# Patient Record
Sex: FEMALE | Employment: FULL TIME | URBAN - METROPOLITAN AREA
[De-identification: names, ages, dates, MRNs, and addresses within clinical notes are randomized per-mention and may not be internally consistent; named-entity substitution may affect disease eponyms.]

---

## 2024-07-06 ENCOUNTER — APPOINTMENT (OUTPATIENT)
Dept: RADIOLOGY | Facility: HOSPITAL | Age: 59
End: 2024-07-06
Payer: COMMERCIAL

## 2024-07-06 ENCOUNTER — CLINICAL SUPPORT (OUTPATIENT)
Dept: EMERGENCY MEDICINE | Facility: HOSPITAL | Age: 59
End: 2024-07-06
Payer: COMMERCIAL

## 2024-07-06 ENCOUNTER — HOSPITAL ENCOUNTER (OUTPATIENT)
Facility: HOSPITAL | Age: 59
Setting detail: OBSERVATION
Discharge: HOME | End: 2024-07-06
Attending: STUDENT IN AN ORGANIZED HEALTH CARE EDUCATION/TRAINING PROGRAM | Admitting: PSYCHIATRY & NEUROLOGY
Payer: COMMERCIAL

## 2024-07-06 VITALS
OXYGEN SATURATION: 95 % | TEMPERATURE: 97.5 F | BODY MASS INDEX: 27.49 KG/M2 | DIASTOLIC BLOOD PRESSURE: 82 MMHG | SYSTOLIC BLOOD PRESSURE: 143 MMHG | WEIGHT: 165 LBS | HEART RATE: 77 BPM | HEIGHT: 65 IN | RESPIRATION RATE: 18 BRPM

## 2024-07-06 DIAGNOSIS — R53.1 LEFT-SIDED WEAKNESS: Primary | ICD-10-CM

## 2024-07-06 DIAGNOSIS — Z86.73 STROKE, RECENT, WITHOUT LATE EFFECT: ICD-10-CM

## 2024-07-06 LAB
ALBUMIN SERPL BCP-MCNC: 4.2 G/DL (ref 3.4–5)
ALP SERPL-CCNC: 108 U/L (ref 33–136)
ALT SERPL W P-5'-P-CCNC: 18 U/L (ref 7–45)
ANION GAP BLDV CALCULATED.4IONS-SCNC: 11 MMOL/L (ref 10–25)
ANION GAP SERPL CALC-SCNC: 15 MMOL/L (ref 10–20)
APTT PPP: 32 SECONDS (ref 27–38)
AST SERPL W P-5'-P-CCNC: 20 U/L (ref 9–39)
ATRIAL RATE: 74 BPM
BASE EXCESS BLDV CALC-SCNC: 2.9 MMOL/L (ref -2–3)
BASOPHILS # BLD AUTO: 0.06 X10*3/UL (ref 0–0.1)
BASOPHILS NFR BLD AUTO: 0.9 %
BILIRUB SERPL-MCNC: 0.4 MG/DL (ref 0–1.2)
BNP SERPL-MCNC: 7 PG/ML (ref 0–99)
BODY TEMPERATURE: 37 DEGREES CELSIUS
BUN SERPL-MCNC: 16 MG/DL (ref 6–23)
CA-I BLDV-SCNC: 1.19 MMOL/L (ref 1.1–1.33)
CALCIUM SERPL-MCNC: 9.5 MG/DL (ref 8.6–10.6)
CARDIAC TROPONIN I PNL SERPL HS: 8 NG/L (ref 0–34)
CHLORIDE BLDV-SCNC: 102 MMOL/L (ref 98–107)
CHLORIDE SERPL-SCNC: 103 MMOL/L (ref 98–107)
CHOLEST SERPL-MCNC: 107 MG/DL (ref 0–199)
CHOLESTEROL/HDL RATIO: 3
CO2 SERPL-SCNC: 24 MMOL/L (ref 21–32)
CREAT SERPL-MCNC: 0.6 MG/DL (ref 0.5–1.05)
EGFRCR SERPLBLD CKD-EPI 2021: >90 ML/MIN/1.73M*2
EOSINOPHIL # BLD AUTO: 0.48 X10*3/UL (ref 0–0.7)
EOSINOPHIL NFR BLD AUTO: 7.2 %
ERYTHROCYTE [DISTWIDTH] IN BLOOD BY AUTOMATED COUNT: 14.3 % (ref 11.5–14.5)
GLUCOSE BLD MANUAL STRIP-MCNC: 174 MG/DL (ref 74–99)
GLUCOSE BLD MANUAL STRIP-MCNC: 195 MG/DL (ref 74–99)
GLUCOSE BLD MANUAL STRIP-MCNC: 196 MG/DL (ref 74–99)
GLUCOSE BLDV-MCNC: 204 MG/DL (ref 74–99)
GLUCOSE SERPL-MCNC: 189 MG/DL (ref 74–99)
HCO3 BLDV-SCNC: 27.9 MMOL/L (ref 22–26)
HCT VFR BLD AUTO: 43.4 % (ref 36–46)
HCT VFR BLD EST: 44 % (ref 36–46)
HDLC SERPL-MCNC: 35.6 MG/DL
HGB BLD-MCNC: 14.3 G/DL (ref 12–16)
HGB BLDV-MCNC: 14.6 G/DL (ref 12–16)
IMM GRANULOCYTES # BLD AUTO: 0.02 X10*3/UL (ref 0–0.7)
IMM GRANULOCYTES NFR BLD AUTO: 0.3 % (ref 0–0.9)
INR PPP: 0.9 (ref 0.9–1.1)
LACTATE BLDV-SCNC: 1.2 MMOL/L (ref 0.4–2)
LDLC SERPL CALC-MCNC: 52 MG/DL
LYMPHOCYTES # BLD AUTO: 3.43 X10*3/UL (ref 1.2–4.8)
LYMPHOCYTES NFR BLD AUTO: 51.4 %
MCH RBC QN AUTO: 25.6 PG (ref 26–34)
MCHC RBC AUTO-ENTMCNC: 32.9 G/DL (ref 32–36)
MCV RBC AUTO: 78 FL (ref 80–100)
MONOCYTES # BLD AUTO: 0.52 X10*3/UL (ref 0.1–1)
MONOCYTES NFR BLD AUTO: 7.8 %
NEUTROPHILS # BLD AUTO: 2.16 X10*3/UL (ref 1.2–7.7)
NEUTROPHILS NFR BLD AUTO: 32.4 %
NON HDL CHOLESTEROL: 71 MG/DL (ref 0–149)
NRBC BLD-RTO: 0 /100 WBCS (ref 0–0)
OXYHGB MFR BLDV: 75.7 % (ref 45–75)
P AXIS: 54 DEGREES
P OFFSET: 180 MS
P ONSET: 135 MS
PCO2 BLDV: 43 MM HG (ref 41–51)
PH BLDV: 7.42 PH (ref 7.33–7.43)
PLATELET # BLD AUTO: 245 X10*3/UL (ref 150–450)
PO2 BLDV: 47 MM HG (ref 35–45)
POTASSIUM BLDV-SCNC: 4.3 MMOL/L (ref 3.5–5.3)
POTASSIUM SERPL-SCNC: 4.1 MMOL/L (ref 3.5–5.3)
PR INTERVAL: 164 MS
PROT SERPL-MCNC: 7.3 G/DL (ref 6.4–8.2)
PROTHROMBIN TIME: 10.1 SECONDS (ref 9.8–12.8)
Q ONSET: 217 MS
QRS COUNT: 12 BEATS
QRS DURATION: 86 MS
QT INTERVAL: 424 MS
QTC CALCULATION(BAZETT): 470 MS
QTC FREDERICIA: 455 MS
R AXIS: 66 DEGREES
RBC # BLD AUTO: 5.59 X10*6/UL (ref 4–5.2)
SAO2 % BLDV: 77 % (ref 45–75)
SODIUM BLDV-SCNC: 137 MMOL/L (ref 136–145)
SODIUM SERPL-SCNC: 138 MMOL/L (ref 136–145)
T AXIS: 70 DEGREES
T OFFSET: 429 MS
TRIGL SERPL-MCNC: 96 MG/DL (ref 0–149)
TSH SERPL-ACNC: 1.73 MIU/L (ref 0.44–3.98)
VENTRICULAR RATE: 74 BPM
VLDL: 19 MG/DL (ref 0–40)
WBC # BLD AUTO: 6.7 X10*3/UL (ref 4.4–11.3)

## 2024-07-06 PROCEDURE — 2500000001 HC RX 250 WO HCPCS SELF ADMINISTERED DRUGS (ALT 637 FOR MEDICARE OP)

## 2024-07-06 PROCEDURE — 2500000004 HC RX 250 GENERAL PHARMACY W/ HCPCS (ALT 636 FOR OP/ED)

## 2024-07-06 PROCEDURE — 70544 MR ANGIOGRAPHY HEAD W/O DYE: CPT | Mod: 59

## 2024-07-06 PROCEDURE — 84132 ASSAY OF SERUM POTASSIUM: CPT | Mod: 59

## 2024-07-06 PROCEDURE — 99291 CRITICAL CARE FIRST HOUR: CPT | Performed by: STUDENT IN AN ORGANIZED HEALTH CARE EDUCATION/TRAINING PROGRAM

## 2024-07-06 PROCEDURE — 82947 ASSAY GLUCOSE BLOOD QUANT: CPT

## 2024-07-06 PROCEDURE — 70547 MR ANGIOGRAPHY NECK W/O DYE: CPT | Performed by: RADIOLOGY

## 2024-07-06 PROCEDURE — 85730 THROMBOPLASTIN TIME PARTIAL: CPT | Performed by: STUDENT IN AN ORGANIZED HEALTH CARE EDUCATION/TRAINING PROGRAM

## 2024-07-06 PROCEDURE — 96361 HYDRATE IV INFUSION ADD-ON: CPT

## 2024-07-06 PROCEDURE — 36415 COLL VENOUS BLD VENIPUNCTURE: CPT | Performed by: STUDENT IN AN ORGANIZED HEALTH CARE EDUCATION/TRAINING PROGRAM

## 2024-07-06 PROCEDURE — G0378 HOSPITAL OBSERVATION PER HR: HCPCS

## 2024-07-06 PROCEDURE — 85025 COMPLETE CBC W/AUTO DIFF WBC: CPT | Performed by: STUDENT IN AN ORGANIZED HEALTH CARE EDUCATION/TRAINING PROGRAM

## 2024-07-06 PROCEDURE — 70450 CT HEAD/BRAIN W/O DYE: CPT | Performed by: RADIOLOGY

## 2024-07-06 PROCEDURE — 70547 MR ANGIOGRAPHY NECK W/O DYE: CPT

## 2024-07-06 PROCEDURE — 2500000002 HC RX 250 W HCPCS SELF ADMINISTERED DRUGS (ALT 637 FOR MEDICARE OP, ALT 636 FOR OP/ED)

## 2024-07-06 PROCEDURE — 83036 HEMOGLOBIN GLYCOSYLATED A1C: CPT

## 2024-07-06 PROCEDURE — 70450 CT HEAD/BRAIN W/O DYE: CPT

## 2024-07-06 PROCEDURE — 70551 MRI BRAIN STEM W/O DYE: CPT

## 2024-07-06 PROCEDURE — 85610 PROTHROMBIN TIME: CPT | Performed by: STUDENT IN AN ORGANIZED HEALTH CARE EDUCATION/TRAINING PROGRAM

## 2024-07-06 PROCEDURE — 70551 MRI BRAIN STEM W/O DYE: CPT | Performed by: RADIOLOGY

## 2024-07-06 PROCEDURE — 93005 ELECTROCARDIOGRAM TRACING: CPT

## 2024-07-06 PROCEDURE — 84443 ASSAY THYROID STIM HORMONE: CPT

## 2024-07-06 PROCEDURE — 96360 HYDRATION IV INFUSION INIT: CPT

## 2024-07-06 PROCEDURE — 80053 COMPREHEN METABOLIC PANEL: CPT | Performed by: STUDENT IN AN ORGANIZED HEALTH CARE EDUCATION/TRAINING PROGRAM

## 2024-07-06 PROCEDURE — 83880 ASSAY OF NATRIURETIC PEPTIDE: CPT

## 2024-07-06 PROCEDURE — 80061 LIPID PANEL: CPT

## 2024-07-06 PROCEDURE — 84484 ASSAY OF TROPONIN QUANT: CPT | Performed by: STUDENT IN AN ORGANIZED HEALTH CARE EDUCATION/TRAINING PROGRAM

## 2024-07-06 RX ORDER — HYDRALAZINE HYDROCHLORIDE 20 MG/ML
10 INJECTION INTRAMUSCULAR; INTRAVENOUS
Status: DISCONTINUED | OUTPATIENT
Start: 2024-07-06 | End: 2024-07-06 | Stop reason: HOSPADM

## 2024-07-06 RX ORDER — CLOPIDOGREL BISULFATE 75 MG/1
75 TABLET ORAL DAILY
Qty: 20 TABLET | Refills: 0 | Status: SHIPPED | OUTPATIENT
Start: 2024-07-07 | End: 2024-07-27

## 2024-07-06 RX ORDER — LABETALOL HYDROCHLORIDE 5 MG/ML
10 INJECTION, SOLUTION INTRAVENOUS EVERY 10 MIN PRN
Status: DISCONTINUED | OUTPATIENT
Start: 2024-07-06 | End: 2024-07-06 | Stop reason: HOSPADM

## 2024-07-06 RX ORDER — HYDRALAZINE HYDROCHLORIDE 25 MG/1
25 TABLET, FILM COATED ORAL EVERY 6 HOURS PRN
Status: DISCONTINUED | OUTPATIENT
Start: 2024-07-08 | End: 2024-07-06 | Stop reason: HOSPADM

## 2024-07-06 RX ORDER — CLOPIDOGREL BISULFATE 75 MG/1
75 TABLET ORAL DAILY
Status: DISCONTINUED | OUTPATIENT
Start: 2024-07-07 | End: 2024-07-06 | Stop reason: HOSPADM

## 2024-07-06 RX ORDER — ASPIRIN 81 MG/1
81 TABLET ORAL DAILY
Status: DISCONTINUED | OUTPATIENT
Start: 2024-07-07 | End: 2024-07-06 | Stop reason: HOSPADM

## 2024-07-06 RX ORDER — DEXTROSE 50 % IN WATER (D50W) INTRAVENOUS SYRINGE
25
Status: DISCONTINUED | OUTPATIENT
Start: 2024-07-06 | End: 2024-07-06 | Stop reason: HOSPADM

## 2024-07-06 RX ORDER — POLYETHYLENE GLYCOL 3350 17 G/17G
17 POWDER, FOR SOLUTION ORAL DAILY
Status: DISCONTINUED | OUTPATIENT
Start: 2024-07-06 | End: 2024-07-06 | Stop reason: HOSPADM

## 2024-07-06 RX ORDER — ATORVASTATIN CALCIUM 20 MG/1
40 TABLET, FILM COATED ORAL NIGHTLY
Status: DISCONTINUED | OUTPATIENT
Start: 2024-07-06 | End: 2024-07-06 | Stop reason: HOSPADM

## 2024-07-06 RX ORDER — INSULIN LISPRO 100 [IU]/ML
0-5 INJECTION, SOLUTION INTRAVENOUS; SUBCUTANEOUS
Status: DISCONTINUED | OUTPATIENT
Start: 2024-07-06 | End: 2024-07-06 | Stop reason: HOSPADM

## 2024-07-06 RX ORDER — CLOPIDOGREL BISULFATE 300 MG/1
300 TABLET, FILM COATED ORAL ONCE
Status: COMPLETED | OUTPATIENT
Start: 2024-07-06 | End: 2024-07-06

## 2024-07-06 RX ORDER — SODIUM CHLORIDE, SODIUM LACTATE, POTASSIUM CHLORIDE, CALCIUM CHLORIDE 600; 310; 30; 20 MG/100ML; MG/100ML; MG/100ML; MG/100ML
100 INJECTION, SOLUTION INTRAVENOUS CONTINUOUS
Status: DISCONTINUED | OUTPATIENT
Start: 2024-07-06 | End: 2024-07-06 | Stop reason: HOSPADM

## 2024-07-06 RX ORDER — DEXTROSE 50 % IN WATER (D50W) INTRAVENOUS SYRINGE
12.5
Status: DISCONTINUED | OUTPATIENT
Start: 2024-07-06 | End: 2024-07-06 | Stop reason: HOSPADM

## 2024-07-06 RX ORDER — ENOXAPARIN SODIUM 100 MG/ML
40 INJECTION SUBCUTANEOUS EVERY 24 HOURS
Status: DISCONTINUED | OUTPATIENT
Start: 2024-07-06 | End: 2024-07-06 | Stop reason: HOSPADM

## 2024-07-06 RX ORDER — DOCUSATE SODIUM 50 MG/5ML
100 LIQUID ORAL 2 TIMES DAILY
Status: DISCONTINUED | OUTPATIENT
Start: 2024-07-06 | End: 2024-07-06

## 2024-07-06 RX ORDER — ASPIRIN 81 MG/1
81 TABLET ORAL DAILY
Qty: 360 TABLET | Refills: 0 | Status: SHIPPED | OUTPATIENT
Start: 2024-07-07 | End: 2025-07-07

## 2024-07-06 RX ORDER — NAPROXEN SODIUM 220 MG/1
81 TABLET, FILM COATED ORAL ONCE
Status: COMPLETED | OUTPATIENT
Start: 2024-07-06 | End: 2024-07-06

## 2024-07-06 RX ADMIN — ASPIRIN 81 MG 81 MG: 81 TABLET ORAL at 09:29

## 2024-07-06 RX ADMIN — INSULIN LISPRO 1 UNITS: 100 INJECTION, SOLUTION INTRAVENOUS; SUBCUTANEOUS at 12:48

## 2024-07-06 RX ADMIN — CLOPIDOGREL BISULFATE 300 MG: 300 TABLET, FILM COATED ORAL at 09:29

## 2024-07-06 RX ADMIN — SODIUM CHLORIDE, POTASSIUM CHLORIDE, SODIUM LACTATE AND CALCIUM CHLORIDE 100 ML/HR: 600; 310; 30; 20 INJECTION, SOLUTION INTRAVENOUS at 09:03

## 2024-07-06 ASSESSMENT — PAIN - FUNCTIONAL ASSESSMENT: PAIN_FUNCTIONAL_ASSESSMENT: 0-10

## 2024-07-06 ASSESSMENT — COLUMBIA-SUICIDE SEVERITY RATING SCALE - C-SSRS
6. HAVE YOU EVER DONE ANYTHING, STARTED TO DO ANYTHING, OR PREPARED TO DO ANYTHING TO END YOUR LIFE?: NO
1. IN THE PAST MONTH, HAVE YOU WISHED YOU WERE DEAD OR WISHED YOU COULD GO TO SLEEP AND NOT WAKE UP?: NO
2. HAVE YOU ACTUALLY HAD ANY THOUGHTS OF KILLING YOURSELF?: NO

## 2024-07-06 ASSESSMENT — PAIN SCALES - GENERAL: PAINLEVEL_OUTOF10: 0 - NO PAIN

## 2024-07-06 NOTE — DISCHARGE INSTRUCTIONS
Dear MsMaxine Farzad,    It was a pleasure treating you at [facility]. You came in left sided numbness which resolved on its own. We think you had a small stroke - which is when a clot form in one of your small vessels supplying blood to the brain.      We have made the following medication changes:   Aspirin 81mg to prevent future strokes  - indefinitely  Plavix 75mg daily for 20 days starting on 7/7  Please continue taking your home atorvastatin 10mg  We have an heart ultrasound ordered for you (complete TTE with bubble study) - please   Call scheduling line at [phone] ([phone]) to schedule your TTE.     Please schedule an appointment with stroke Neurology in your hometown in 6-12 weeks.   If you would like to schedule an appointment with us.  please call the scheduling line at [phone] ([phone])     Stroke prevention:  Eat a healthy diet with plenty of fresh fruits and vegetables.  Avoid salt and fried foods.  Lose weight and exercise on a regular basis.  Do not smoke or use drugs.  Be sure to take all medications.  Call 911 for signs of stroke (numbness or weakness on one side, trouble seeing on one side, trouble speaking (either because your speech is slurred or you can't find the words), sudden double vision, spinning sensation or loss of coordination).    To find a stroke support group: [email or web address]://[email or web address].org/en/stroke-support-group-finder  To learn about Virtual Stroke Educations sessions: contact Kathryn Morejon at: [email or web address]    wishing you a speedy recovery,    Neurology

## 2024-07-06 NOTE — ED TRIAGE NOTES
Lkw 2200 last night, had some left side numbness to arm and leg that subsided, woke up this am for left side numbness to face along with arms and legs, dr puente in triage to assess pt, BAT called

## 2024-07-06 NOTE — HOSPITAL COURSE
Nat Panda is a 59 y.o. female with PMH of hypothyroidism, DM-2 and HLD presenting with left sided numbness. Per patient and son at bedside patient has onset of intermittent left lower extremity numbness last night which resolved and this AM numbness progressed to involve left side of the face and arm. BAT was called on arrival to [facility].   NIHSS: 1 (sensory loss)     MRS: 0  Last known well: 10PM on 7/6  Had stroke symptoms resolved at time of presentation: No     As patient was OOW from LKN>4.5hr, they werent a candidate for TNK  Low NIHSS, and low concern for LVO - not a candidate for MT.      MRI brain was done and showed evidence of small vessel disease but no acute stroke. MRA H&N was negative for intracranial atherosclerosis. Patient was kept under permissive HTN. Patient was loaded with plavix 300mg and aspirin 81mg with plan for 21 days of DAPT per [facility] protocol. Symptoms were attributed to a stuttering lacune causing fluctuating left sided numbness.     HbA1c: 7  LDL: 52  Patient and family opted to continue workup outpatient. Patient was discharged to home and instructed to follow up with stroke in 6-12 weeks.

## 2024-07-06 NOTE — ED PROCEDURE NOTE
Procedure  Critical Care    Performed by: River Guevara MD  Authorized by: River Guevara MD    Critical care provider statement:     Critical care time (minutes):  32  Comments:      Critical Care Time  Authorized and Performed by: River Guevara MD  Total critical care time: [32] minutes  Due to a high probability of clinically significant, life threatening deterioration, the patient required my highest level of preparedness to intervene emergently and I personally spent this critical care time directly and personally managing the patient. This critical care time included obtaining a history; examining the patient; pulse oximetry; ordering and review of studies; arranging urgent treatment with development of a management plan; evaluation of patient's response to treatment; frequent reassessment; and, discussions with other providers and patient/family.  This critical care time was performed to assess and manage the high probability of imminent, life-threatening deterioration that could result in multi-organ failure. It was exclusive of separately billable procedures and treating other patients and teaching time.  Please see MDM section and the rest of the note for further information on patient assessment and treatment.             River Guevaar MD  07/06/24 0825

## 2024-07-06 NOTE — H&P
History Of Present Illness  Nat aPnda is a 59 y.o. female with PMH of hypothyroidism, DM-2 and HLD presenting with left sided numbness. Per patient and son at bedside patient has onset of intermittent left lower extremity numbness last night which resolved and this AM numbness progressed to involve left side of the face and arm. BAT was called on arrival to [facility].    NIHSS: 1 (sensory loss)    MRS: 0  Last known well: 10PM on 7/6  Had stroke symptoms resolved at time of presentation: No    As patient was OOW from LKN>4.5hr, they werent a candidate for TNK  Low NIHSS, and low concern for LVO - not a candidate for MT.     Past Medical History  Patient Active Problem List   Diagnosis Code    Fibromyalgia M79.7    DDD (degenerative disc disease), cervical M50.30    Anxiety F41.9    Dense breasts R92.30    Type 2 diabetes mellitus without complication (HCC) E11.9    Chronic neutropenia (HCC) D70.9    Hallux valgus M20.10    Hallux limitus M20.5X9    Otalgia of left ear H92.02    Hypothyroidism due to acquired atrophy of thyroid E03.4    Overweight (BMI 25.0-29.9) E66.3    Fatty infiltration of liver K76.0     Surgical History  No past surgical history on file.    Social History   Non smoker, no alcohol use, no illicit drug use  Lives at home with Son and     Allergies  Penicillins  Home Medications  Current Outpatient Medications on File Prior to Visit   Medication Sig Dispense Refill    atorvaSTATin (LIPITOR) 10 MG tablet TAKE 1 TABLET BY MOUTH DAILY FOR HIGH AMOUNTS OF FAT IN THE BLOOD 90 tablet 1    cetirizine (ZYRTEC) 10 MG tablet Take 1 tablet (10 mg) by mouth daily        empagliflozin (JARDIANCE) 10 MG tablet Take 1 tablet (10 mg) by mouth daily for 7 days Indications: Type 2 Diabetes 7 tablet 0    Fluocin-Hydroquinone-Tretinoin (TRI-GEN) 0.01-4-0.05 % CREA APPLY TOPICALLY DAILY A THIN FILM TO AFFECTED AREA FOR 3 MONTH        levothyroxine (SYNTHROID) 50 MCG tablet 1 tablet (50 mcg) every 24 hours      "   Multiple Vitamin (MULTI-VITAMIN DAILY PO) Take        VITAMIN D PO Take           Review of Systems  Neurological Exam  Physical Exam  Last Recorded Vitals  Blood pressure 160/80, pulse 73, temperature 36.4 °C (97.5 °F), temperature source Tympanic, resp. rate 16, height 1.651 m (5' 5\"), weight 74.8 kg (165 lb), SpO2 97%.    Neurological Exam:  MENTAL STATUS:  General appearance: No distress, alert, interactive and cooperative.    Orientation:x3  Language: Expression, repetition, naming, comprehension intact  Follows complex commands across midline  Thought processes: Logical, organized  Concentration: Intact  Fund of knowledge: Appropriate  Judgment: Intact  Insight: Intact    CRANIAL NERVES:  - II:  Visual fields intact to confrontation bilaterally tested individually and together  - III, IV, VI: PERRL, EOMI to pursuit without nystagmus  - V: V1-V3 sensation intact bilaterally  - VII: Face muscles symmetric with smile and eye closure  - VIII: Intact to finger rub bilaterally  - IX, X: Palate elevated symmetrically bilaterally, no hoarseness  - XI: 5/5 strength on shoulder shrugging bilaterally  - XII: Tongue midline without atrophy or fasciculation    MOTOR: Tone and bulk normal in all extremities  No pronator drift bilaterally.   No fasciculations, tremor or other abnormal movements were present.     STRENGTH: R L  Deltoid  5 5-  Biceps  5 5-  Triceps  5 5-    5 5-    Hip flexion 5 5  Quadriceps 5 5  Hamstrings 5 5  DorsiFlex 5          5  PlantarFlex 5 5    REFLEXES:  R  L  Biceps   2  2  Triceps   2  2  Brachioradialis  2  2  Patellar   2  2  Achilles 2  2  Plantar  Down Down    No Chong, crossed adductor, Babinski, or clonus    COORDINATION: Intact on finger to nose bl, intact on heel to shin bl, LUCY intact bl    SENSORY: Reduced pinprick in LLE    PROPRIOCEPTION:  Intact in fingers and toes bilaterally      GAIT: Not tested    Relevant Results      NIH Stroke Scale  1A. Level of Consciousness: Alert, " Keenly Responsive  1B. Ask Month and Age: Both Questions Right  1C. Blink Eyes & Squeeze Hands: Performs Both Tasks  2. Best Gaze: Normal  3. Visual: No Visual Loss  4. Facial Palsy: Normal Symmetrical Movements  5A. Motor - Left Arm: No Drift  5B. Motor - Right Arm: No Drift  6A. Motor - Left Leg: No Drift  6B. Motor - Right Leg: No Drift  7. Limb Ataxia: Absent  8. Sensory Loss: Mild-to-Moderate Sensory Loss  9. Best Language: No Aphasia  10. Dysarthria: Normal  11. Extinction and Inattention: No Abnormality  NIH Stroke Scale: 1           [place] Coma Scale  Best Eye Response: Spontaneous  Best Verbal Response: Oriented  Best Motor Response: Follows commands  Radha Coma Scale Score: 15                   CT brain attack head wo IV contrast  Result Date: 7/6/2024    No acute intracranial abnormality.   Signed by: Brenda Torres 7/6/2024 8:53 AM Dictation workstation:   [id number]  .     Stroke Alert CT/MRI review: Was interpreted as it was being performed     IV Thrombolysis IV Thrombolysis Checklist      IV Thrombolysis Given: No; Thrombolysis contraindication reason: Time from Last Known Well (or stroke onset) is >4.5 hours        Assessment/Plan   Principal Problem:    Stroke, recent, without late effect  Nat Panda is a 59 y.o. female with PMH of hypothyroidism, DM-2 and HLD presenting with fluctuating left sided numbness. Per patient and son at bedside patient has onset of intermittent left lower extremity numbness last night which resolved and this AM numbness progressed to involve left side of the face and arm. No TNK or MT. Loaded with plavix and started on aspirin. Patients presentation c/f a stuttering lacune. Will admit to stroke team for expedited stroke workup.     Type: Ischemic stroke  Subtype/etiology: Stuttering lacune - likely small vessel disease  Vessels involved: possibly R. Anterior choridal vs thalamic vasculature  Neurological manifestations: fluctuating left sided numbness, LUE mild  weakness  NIHSS (worst at presentation): 1   Diagnostic evaluation: CT head, MRI Brain, MRA H&N  Antiplatelet/antithrombotic plan for stroke prevention: Plavix, Aspirin  VTE prophylaxis: Lovenox  Vascular Risk Factor modification goals:  Blood pressure goals: avoid hypotension SBP <100 that could worsen cerebral perfusion, Ischemic stroke- early permissive hypertension SBP < 220 mmHg with cautious inpatient lowering  Lipid Goals: education on healthy diet and statin therapy to maintain or achieve goal LDL-cholesterol < 70mg  Glucose Goals: early treatment of hyperglycemia to goal glucose 140-180 mg/dl with long-term goal A1c < 7%   Smoking Cessation and Education  Assessment for Rehabilitation needs   Patient and family education on signs and symptoms of stroke, calling 911, healthy strategies for stroke prevention.       Plan:   #Ischemic stroke  #R. Lacunar stroke  NIHSS: 1   Neurologically - left hemisensory loss (fluctuating) and LUE mild weakness  ::Stroke risk factors: DM2 and HLD  - CT head neg  -Pending MRI Brain wo contrast and MRA h&N  -Pending TTE with bubble study  -Pending HbA1c and LDL  -DAPT per [facility] protocol  -Started aspirin 81mg  -s/p 300mg plavix - will start plavix 75mg tomorrow  - started atorvastatin 40mg  -On Tele  -SBP< 220 for permissive HTN  - q4hr Neuro checks  -NPO pending nursing bedside assessment    #HLD  -On home atorvastatin 10mg  -stopped  -Started on atorvastatin 40mg  -LDL pending    #Hypothyroidism  -c/w home 50mcg synthroid    #DM2  -Hold home anti-glycemic agents  -Mild SSI +accu checks  -Hypoglycemia protocol    F: PRN  E: PRN  N: Regular diet pending nursing assessment  DVT ppx: SCDs, Lovenox  PPI: None indicated    Code Status: Full code  NOK: : will input info in EMR (Son is pulm fellow at )  I spent 60 minutes in the professional and overall care of this patient.    Sharon Bermudez MD  Neurology PGY-3

## 2024-07-06 NOTE — ED PROVIDER NOTES
CC: Stroke     History provided by: Patient and Family Member  Limitations to History: None    HPI:  Patient is a 59-year-old female with history of diabetes, hyperlipidemia on statin who presents  as a brain attack.  Patient presents with family at bedside.  Last known well 10 PM yesterday.  Patient states she went to bed with left thigh sensory deficits.  When she woke up this morning the sensory loss and numbness increased and present in her left upper extremity and face.  She denies any history of strokes, heart attacks.  Blood sugar within normal limits.    External Records Reviewed:  I reviewed prior ED visits, Care Everywhere, discharge summaries and outpatient records as appropriate.   ???????????????????????????????????????????????????????????????  Triage Vitals:  T 36.4 °C (97.5 °F)  HR 73  /79  RR 16  O2 98 % None (Room air)    Physical Exam  Vitals and nursing note reviewed.   Constitutional:       General: She is not in acute distress.     Appearance: Normal appearance.   HENT:      Head: Normocephalic and atraumatic.   Eyes:      Conjunctiva/sclera: Conjunctivae normal.   Cardiovascular:      Rate and Rhythm: Normal rate and regular rhythm.   Pulmonary:      Effort: Pulmonary effort is normal. No respiratory distress.   Abdominal:      General: Abdomen is flat.      Palpations: Abdomen is soft.   Musculoskeletal:         General: Normal range of motion.      Cervical back: Normal range of motion and neck supple.   Skin:     General: Skin is warm and dry.   Neurological:      Mental Status: She is alert and oriented to person, place, and time. Mental status is at baseline.      Comments: - LKW: 2200 7/5/2024      - 1a. Level of Consciousness: 0/3      - 1b. LOC Questions: 0/2      - 1c. LOC Commands: 0/2      - 2. Best Gaze: 0/2      - 3. Visual Fields: 0/3      - 4. Facial Palsy: 0/3      - 5a. Motor Arm (Left): 0/4      - 5b. Motor Arm (Right): 0/4      - 6a. Motor Leg (Left): 0/4      - 6b.  Motor Leg (Right): 0/4      - 7. Limb Ataxia: 0/2      - 8. Sensory: 1/2      - 9. Best Language: 0/3      - 10. Dysarthria: 0/2      - 11. Extinction/Neglect: 0/2      - TOTAL: 1/42      - VAN: Negative      - TIME: 0848    Left facial, upper extremity and left lower extremity sensory deficit compared to right     Psychiatric:         Mood and Affect: Mood normal.         Behavior: Behavior normal.        ???????????????????????????????????????????????????????????????  ED Course/Treatment/Medical Decision Making  MDM:  Patient is a 59-year-old female who presents as a brain attack.  Vital signs are stable, she is normotensive.  Van negative, initial NIH of 1 for sensory deficits to left face, upper extremity and lower extremity.  Stroke team at bedside.  Initial CT scan of head reviewed without obvious infarct or hemorrhage.  Patient is not a TNK candidate at this time due to last known well being 10 PM last night, not on any blood thinners, no recent surgery noted.      ED Course:  ED Course as of 07/06/24 0906   Sat Jul 06, 2024   0903 Neuro stroke at bedside, patient admitted to their service with workup pending, will follow while patient is boarding in ED [SA]   0904 Patient loaded with 300mg Plavix and aspirin 81mg [SA]      ED Course User Index  [SA] Yany Owens, DO         Diagnoses as of 07/06/24 0906   Left-sided weakness       Scoring Tools Utilized:       EKG Interpretation:  See ED Course/Below:    Independent Interpretation of Studies:  I independently interpreted labs/imaging as stated in ED Course or below.    Differential diagnoses considered include but are not limited to: See MDM/Below:    Social Determinants Limiting Care:  None identified The following actions were taken to address these social determinants: None    Discussion of Management with Other Providers: See MDM/Below:    Disposition:  Admitted    Yany Owens, SUNSHINE, PGY-3    I reviewed the case with the attending ED physician.  The attending ED physician agrees with the plan. Patient and/or patient´s representative was counseled regarding labs, imaging, likely diagnosis, and plan. All questions were answered.    Disclaimer: This note was dictated by speech recognition.  Attempt at proofreading was made to minimize errors.  Errors in transcription may be present.  Please call if questions.    Procedures ? BemDireto last updated 7/6/2024 9:06 AM     ATTENDING NOTE for River Guevara MD:    ATTENDING ATTESTATION:  The patient was seen by the resident/fellow.  I have personally performed a substantive portion of the encounter.  I have seen and examined the patient; agree with the workup, evaluation, MDM, management and diagnosis.  The care plan has been discussed with the resident/fellow; I have reviewed the resident/fellow´s note and agree with the documented findings with the exception/addition of the following:    Patient is a 59-year-old woman with history of diabetes hyperlipidemia and hypothyroidism who presents as a brain attack due to decreased sensation on the left side of her body.  Patient reports that she developed some decree sensation in her left leg the previous night and thinks it may have resolved before she went to bed at 10 PM which was the time that she was last known well.  When she woke this morning she had decreased sensation on the entire left side of her body including her face upper extremity and lower extremity again.  Patient's son is a critical care fellow and assessed her and said that she had no other neurologic deficits the previous evening.  Patient has had no falls or trauma and denies any pain.  Patient is Van negative but due to concern that this might be a wake-up stroke, we did make her a brain attack to involve neurology.  We did not do vessel imaging initially given low suspicion for dissection or large vessel occlusion.    ---------------------------------------------------------            Yany  DO Roman  Resident  07/06/24 0906

## 2024-07-06 NOTE — DISCHARGE SUMMARY
Discharge Diagnosis  Stroke, recent, without late effect    Problem List  Principal Problem:    Stroke, recent, without late effect      Nat Panda is a 59 y.o. female who presented to the hospital with L. Sided numbness. They were diagnosed with a stroke.  Etiology: Ischemic Stroke: Small-vessel disease    Relevant hospital complications: None  Discharge antithrombotics: aspirin and plavix  Pending evaluation:  TTE with bubble study   Issues Requiring Follow-Up  Stroke follow up in 6-12 weeks    MR brain wo IV contrast    Result Date: 7/6/2024  Interpreted By:  Brenda Torres, STUDY: MR BRAIN WO IV CONTRAST; MR ANGIO NECK WO IV CONTRAST; MR ANGIO HEAD WO IV CONTRAST   INDICATION: Signs/Symptoms:Left sided numbness; Signs/Symptoms:stroke workup; Signs/Symptoms:vessel imaging iso left sided numbness   COMPARISON: Head CT performed earlier today.   ACCESSION NUMBER(S): [id number]; [id number]; [id number]   ORDERING CLINICIAN: KALIA MESA   TECHNIQUE: Multi-planar multi-sequential MR imaging of the brain was performed without intravenous contrast. MRA of the head using time-of-flight technique was performed without intravenous contrast. MRA of the neck using time-of-flight technique was performed without intravenous contrast.   FINDINGS: MRI BRAIN:   No acute infarction, intracranial hemorrhage or mass lesion.   No hydrocephalus.  No extra-axial fluid collections. The skull base flow voids are present.   The visualized intraorbital contents are normal. The imaged portions of the paranasal sinuses are clear. The mastoid air cells are clear. The visualized osseous structures, soft tissues and partially visualized parotid glands appear normal.   MRA NECK:   Standard configuration of the aortic arch with no gross ostial stenosis of the great vessels.   The common carotid arteries are patent bilaterally without evidence of stenosis. There is no narrowing of the cervical internal carotid arteries bilaterally.   The  vertebral arteries are patent bilaterally throughout their course.   MRA HEAD:   Normal distal internal carotid arteries.   The proximal anterior, middle and posterior cerebral arteries are patent bilaterally.   Normal vertebrobasilar system.   No evidence of vascular stenosis, occlusion, aneurysm or vascular malformation.       No acute intracranial abnormality.   Cervical and intracranial vasculature are within normal limits.   _____________ NASCET criteria for internal carotid artery stenosis: Mild: 0% to 49% Moderate: 50% to 69% Severe: 70% to 99% Complete Occlusion   Signed by: Brenda Torres 7/6/2024 12:03 PM Dictation workstation:   [id number]   No CT head results found for the past 14 days  No echocardiogram results found for the past 14 days        BNP   Date/Time Value Ref Range Status   07/06/2024 08:58 AM 7 0 - 99 pg/mL Final       Test Results Pending At Discharge  Pending Labs       Order Current Status    Hemoglobin A1C In process            Hospital Course  Nat Panda is a 59 y.o. female with PMH of hypothyroidism, DM-2 and HLD presenting with left sided numbness. Per patient and son at bedside patient has onset of intermittent left lower extremity numbness last night which resolved and this AM numbness progressed to involve left side of the face and arm. BAT was called on arrival to [facility].   NIHSS: 1 (sensory loss)     MRS: 0  Last known well: 10PM on 7/6  Had stroke symptoms resolved at time of presentation: No     As patient was OOW from LKN>4.5hr, they werent a candidate for TNK  Low NIHSS, and low concern for LVO - not a candidate for MT.      MRI brain was done and showed evidence of small vessel disease but no acute stroke. MRA H&N was negative for intracranial atherosclerosis. Patient was kept under permissive HTN. Patient was loaded with plavix 300mg and aspirin 81mg with plan for 21 days of DAPT per [facility] protocol. Symptoms were attributed to a stuttering lacune causing fluctuating left  sided numbness.     HbA1c: 7  LDL: 52  Patient and family opted to continue workup outpatient. Patient was discharged to home and instructed to follow up with stroke in 6-12 weeks.    Pertinent Physical Exam At Time of Discharge  Physical Exam  Neurological Exam  Neurological Exam:  MENTAL STATUS:  General appearance: No distress, alert, interactive and cooperative.    Orientation:x3  Language: Expression, naming, comprehension intact  Follows complex commands across midline  Concentration: Intact  Fund of knowledge: Appropriate  Judgment: Intact  Insight: Intact    CRANIAL NERVES:  - II:  Visual fields intact to confrontation bilaterally tested individually and together  - III, IV, VI: PERRL, EOMI to pursuit without nystagmus  - V: V1-V3 sensation intact bilaterally  - VII: Face muscles symmetric with smile and eye closure  - VIII: Intact to finger rub bilaterally  - IX, X: Palate elevated symmetrically bilaterally, no hoarseness  - XI: 5/5 strength on shoulder shrugging bilaterally  - XII: Tongue midline without atrophy or fasciculation    MOTOR: Tone and bulk normal in all extremities  No pronator drift bilaterally.   No fasciculations, tremor or other abnormal movements were present.     STRENGTH: R L  Deltoid  5 5  Biceps  5 5  Triceps  5 5    5 5    Hip flexion 5 5  Quadriceps 5 5  Hamstrings 5 5  DorsiFlex 5          5  PlantarFlex 5 5    REFLEXES:  R  L  Biceps   2  2  Triceps   2  2  Brachioradialis  2  2  Patellar   2  2  Achilles 2  2  Plantar  Down Down    No Chong, crossed adductor, Babinski, or clonus    COORDINATION: Intact on finger to nose bl, intact on heel to shin bl, LUCY intact bl    SENSORY: Intact to light touch, pin prick, vibration in bl UE and LE    PROPRIOCEPTION:  Intact in fingers and toes bilaterally    ROMBERG: Negative    GAIT: Station was stable with a normal base. Gait was stable with a normal arm swing and speed. No ataxia, shuffling, steppage or waddling was present. No  circumduction was present.  No Romberg sign was present.   Outpatient Follow-Up  No future appointments.    Sharon Bermudez MD

## 2024-07-09 LAB
EST. AVERAGE GLUCOSE BLD GHB EST-MCNC: 235 MG/DL
HBA1C MFR BLD: 9.8 %